# Patient Record
Sex: FEMALE | Race: BLACK OR AFRICAN AMERICAN | HISPANIC OR LATINO | Employment: UNEMPLOYED | URBAN - METROPOLITAN AREA
[De-identification: names, ages, dates, MRNs, and addresses within clinical notes are randomized per-mention and may not be internally consistent; named-entity substitution may affect disease eponyms.]

---

## 2024-06-20 ENCOUNTER — OFFICE VISIT (OUTPATIENT)
Dept: INTERNAL MEDICINE | Facility: CLINIC | Age: 64
End: 2024-06-20
Payer: COMMERCIAL

## 2024-06-20 VITALS
HEART RATE: 83 BPM | OXYGEN SATURATION: 98 % | SYSTOLIC BLOOD PRESSURE: 148 MMHG | TEMPERATURE: 98.2 F | WEIGHT: 209 LBS | HEIGHT: 63 IN | BODY MASS INDEX: 37.03 KG/M2 | DIASTOLIC BLOOD PRESSURE: 70 MMHG | RESPIRATION RATE: 16 BRPM

## 2024-06-20 DIAGNOSIS — M77.31 HEEL SPUR, RIGHT: ICD-10-CM

## 2024-06-20 DIAGNOSIS — M25.532 LEFT WRIST PAIN: ICD-10-CM

## 2024-06-20 DIAGNOSIS — M54.42 CHRONIC BILATERAL LOW BACK PAIN WITH BILATERAL SCIATICA: ICD-10-CM

## 2024-06-20 DIAGNOSIS — Z11.59 SCREENING FOR VIRAL DISEASE: ICD-10-CM

## 2024-06-20 DIAGNOSIS — R73.9 HYPERGLYCEMIA: ICD-10-CM

## 2024-06-20 DIAGNOSIS — M54.41 CHRONIC BILATERAL LOW BACK PAIN WITH BILATERAL SCIATICA: ICD-10-CM

## 2024-06-20 DIAGNOSIS — G89.29 CHRONIC BILATERAL LOW BACK PAIN WITH BILATERAL SCIATICA: ICD-10-CM

## 2024-06-20 DIAGNOSIS — E11.65 TYPE 2 DIABETES MELLITUS WITH HYPERGLYCEMIA, WITHOUT LONG-TERM CURRENT USE OF INSULIN: ICD-10-CM

## 2024-06-20 DIAGNOSIS — L30.9 ECZEMA, UNSPECIFIED TYPE: ICD-10-CM

## 2024-06-20 DIAGNOSIS — M79.671 RIGHT FOOT PAIN: ICD-10-CM

## 2024-06-20 DIAGNOSIS — R03.0 ELEVATED BLOOD PRESSURE READING WITHOUT DIAGNOSIS OF HYPERTENSION: Primary | ICD-10-CM

## 2024-06-20 DIAGNOSIS — Z12.31 ENCOUNTER FOR SCREENING MAMMOGRAM FOR BREAST CANCER: ICD-10-CM

## 2024-06-20 LAB
ALBUMIN SERPL-MCNC: 4.1 G/DL (ref 3.5–5.2)
ALBUMIN/GLOB SERPL: 1 G/DL
ALP SERPL-CCNC: 140 U/L (ref 39–117)
ALT SERPL W P-5'-P-CCNC: 39 U/L (ref 1–33)
ANION GAP SERPL CALCULATED.3IONS-SCNC: 7 MMOL/L (ref 5–15)
AST SERPL-CCNC: 26 U/L (ref 1–32)
BASOPHILS # BLD AUTO: 0.05 10*3/MM3 (ref 0–0.2)
BASOPHILS NFR BLD AUTO: 0.7 % (ref 0–1.5)
BILIRUB SERPL-MCNC: 0.2 MG/DL (ref 0–1.2)
BUN SERPL-MCNC: 16 MG/DL (ref 8–23)
BUN/CREAT SERPL: 15.4 (ref 7–25)
CALCIUM SPEC-SCNC: 9.5 MG/DL (ref 8.6–10.5)
CHLORIDE SERPL-SCNC: 98 MMOL/L (ref 98–107)
CHOLEST SERPL-MCNC: 142 MG/DL (ref 0–200)
CO2 SERPL-SCNC: 31 MMOL/L (ref 22–29)
CREAT SERPL-MCNC: 1.04 MG/DL (ref 0.57–1)
DEPRECATED RDW RBC AUTO: 44.9 FL (ref 37–54)
EGFRCR SERPLBLD CKD-EPI 2021: 60.1 ML/MIN/1.73
EOSINOPHIL # BLD AUTO: 0.23 10*3/MM3 (ref 0–0.4)
EOSINOPHIL NFR BLD AUTO: 3.4 % (ref 0.3–6.2)
ERYTHROCYTE [DISTWIDTH] IN BLOOD BY AUTOMATED COUNT: 13.1 % (ref 12.3–15.4)
GLOBULIN UR ELPH-MCNC: 4 GM/DL
GLUCOSE SERPL-MCNC: 150 MG/DL (ref 65–99)
HCT VFR BLD AUTO: 41.9 % (ref 34–46.6)
HCV AB SER QL: NORMAL
HDLC SERPL-MCNC: 32 MG/DL (ref 40–60)
HGB BLD-MCNC: 13.5 G/DL (ref 12–15.9)
IMM GRANULOCYTES # BLD AUTO: 0.02 10*3/MM3 (ref 0–0.05)
IMM GRANULOCYTES NFR BLD AUTO: 0.3 % (ref 0–0.5)
LDLC SERPL CALC-MCNC: 80 MG/DL (ref 0–100)
LDLC/HDLC SERPL: 2.37 {RATIO}
LYMPHOCYTES # BLD AUTO: 2.69 10*3/MM3 (ref 0.7–3.1)
LYMPHOCYTES NFR BLD AUTO: 40.1 % (ref 19.6–45.3)
MCH RBC QN AUTO: 29.5 PG (ref 26.6–33)
MCHC RBC AUTO-ENTMCNC: 32.2 G/DL (ref 31.5–35.7)
MCV RBC AUTO: 91.5 FL (ref 79–97)
MONOCYTES # BLD AUTO: 0.77 10*3/MM3 (ref 0.1–0.9)
MONOCYTES NFR BLD AUTO: 11.5 % (ref 5–12)
NEUTROPHILS NFR BLD AUTO: 2.95 10*3/MM3 (ref 1.7–7)
NEUTROPHILS NFR BLD AUTO: 44 % (ref 42.7–76)
NRBC BLD AUTO-RTO: 0 /100 WBC (ref 0–0.2)
PLATELET # BLD AUTO: 217 10*3/MM3 (ref 140–450)
PMV BLD AUTO: 11.6 FL (ref 6–12)
POTASSIUM SERPL-SCNC: 4.6 MMOL/L (ref 3.5–5.2)
PROT SERPL-MCNC: 8.1 G/DL (ref 6–8.5)
RBC # BLD AUTO: 4.58 10*6/MM3 (ref 3.77–5.28)
SODIUM SERPL-SCNC: 136 MMOL/L (ref 136–145)
TRIGL SERPL-MCNC: 171 MG/DL (ref 0–150)
TSH SERPL DL<=0.05 MIU/L-ACNC: 1.8 UIU/ML (ref 0.27–4.2)
VLDLC SERPL-MCNC: 30 MG/DL (ref 5–40)
WBC NRBC COR # BLD AUTO: 6.71 10*3/MM3 (ref 3.4–10.8)

## 2024-06-20 PROCEDURE — 83036 HEMOGLOBIN GLYCOSYLATED A1C: CPT | Performed by: PHYSICIAN ASSISTANT

## 2024-06-20 PROCEDURE — 86803 HEPATITIS C AB TEST: CPT | Performed by: PHYSICIAN ASSISTANT

## 2024-06-20 PROCEDURE — 85025 COMPLETE CBC W/AUTO DIFF WBC: CPT | Performed by: PHYSICIAN ASSISTANT

## 2024-06-20 PROCEDURE — 84443 ASSAY THYROID STIM HORMONE: CPT | Performed by: PHYSICIAN ASSISTANT

## 2024-06-20 PROCEDURE — 80061 LIPID PANEL: CPT | Performed by: PHYSICIAN ASSISTANT

## 2024-06-20 PROCEDURE — 80053 COMPREHEN METABOLIC PANEL: CPT | Performed by: PHYSICIAN ASSISTANT

## 2024-06-20 RX ORDER — TRIAMCINOLONE ACETONIDE 1 MG/G
1 OINTMENT TOPICAL 2 TIMES DAILY
Qty: 30 G | Refills: 0 | Status: SHIPPED | OUTPATIENT
Start: 2024-06-20

## 2024-06-20 NOTE — PROGRESS NOTES
Chief Complaint  Establish Care, Arm Pain, Numbness, Back Pain, Foot Pain, and skin issue    Subjective          Florina Vargas presents to [facility]  History of Present Illness  Last PCP: Anil Thornton in [place]  Previous Specialists:  gastroenterology for colonoscopy  Last labs: Unknown  Last mammogram: 2023, no family history of breast cancer  Last pap: 1980s  Last colonoscopy: Sept 2023-  done in [place] at [facility] in [facility],  family history of colon cancer - maternal aunt    Pt here to [facility] with several complaints    Rash: bilateral elbows x 1 month  Started when she moved to [place]   Rash is red and itchy   Denies rash anywhere else     LBP: x yrs   Pain goes down both legs. Tingling in both legs.   Denies incontinence or saddle anesthesia   Pt has been dx with DDD on Xray 1 year ago   States she was dx with arthritis   Previously prescribed Tramadol   She was previously trying PT in [place] but it did not help  Tylenol and naproxen which helps     States she fell in Feb before she moved to [place]. States she slipped and fell on buttock, L wrist  She states she had Xray done after the fall and was told it was normal.   She is still having pain in L wrist.  She states hand tingling at times   She feels strength in hand is decreased     Pt states she fell in Jan because she twisted her R foot  She has had pain in R foot since that time        History reviewed. No pertinent past medical history.     Past Surgical History:   Procedure Laterality Date    APPENDECTOMY  1985    HAND SURGERY Bilateral 2000    carpal tunnel    KNEE SURGERY Left 2000    ligament injury after a fall        No current outpatient medications on file prior to visit.     No current facility-administered medications on file prior to visit.        No Known Allergies    Social History     Tobacco Use   Smoking Status Never   Smokeless Tobacco Never          Objective   Vital  "Signs:   /70 (BP Location: Left arm, Patient Position: Sitting, Cuff Size: Adult)   Pulse 83   Temp 98.2 °F (36.8 °C) (Temporal)   Resp 16   Ht 160 cm (63\")   Wt 94.8 kg (209 lb)   SpO2 98%   BMI 37.02 kg/m²     Physical Exam  Vitals reviewed.   Constitutional:       Appearance: Normal appearance.   HENT:      Head: Normocephalic and atraumatic.      Nose: Nose normal.      Mouth/Throat:      Mouth: Mucous membranes are moist.   Eyes:      Extraocular Movements: Extraocular movements intact.      Conjunctiva/sclera: Conjunctivae normal.      Pupils: Pupils are equal, round, and reactive to light.   Cardiovascular:      Rate and Rhythm: Normal rate and regular rhythm.   Pulmonary:      Effort: Pulmonary effort is normal.      Breath sounds: Normal breath sounds.   Abdominal:      General: Abdomen is flat. Bowel sounds are normal.      Palpations: Abdomen is soft.   Musculoskeletal:         General: Normal range of motion.      Left wrist: Tenderness present.        Arms:       Lumbar back: Tenderness present.        Back:         Feet:    Feet:      Comments: TTP  Skin:     Findings: Rash present.          Neurological:      General: No focal deficit present.      Mental Status: She is alert and oriented to person, place, and time.   Psychiatric:         Mood and Affect: Mood normal.        Result Review :   The following data was reviewed by: Chastity Martel PA-C on 06/20/2024:    Data reviewed : Radiologic studies xrays                    Assessment and Plan    Diagnoses and all orders for this visit:    1. Elevated blood pressure reading without diagnosis of hypertension (Primary)  Assessment & Plan:  Discussed bp elevation at today's visit. Not high enough for medication at this time. Discussed risks of blood pressure elevation including death, heart attack, stroke, kidney disease, blindness. Low salt diet, increase exercise. We will monitor at follow up and discuss blood pressure medications if " necessary. To er if chest pain, palpitations, vision loss, unilateral weakness, altered mental status. Pt understands and agrees with plan.      Orders:  -     Comprehensive Metabolic Panel  -     CBC & Differential  -     TSH  -     Lipid Panel    2. Encounter for screening mammogram for breast cancer  -     Mammo Screening Digital Tomosynthesis Bilateral With CAD; Future    3. Screening for viral disease  -     Hepatitis C Antibody    4. Eczema, unspecified type  Assessment & Plan:  Discussed eczema. Keep skin moisturized, using moisturizing lotion or cream such as Aquaphor or Cedafil 1-2 times daily. Encouraged to decrease quantity of baths if possible. Use unscented soap in baths. Try All Free and Clear laundry detergent. Will start topical steriod for severe symptoms. Only use as needed due to risks of hypopigmentation and skin thinning.         5. Left wrist pain  Comments:  X-ray showed arthritis, will refer to physical therapy  Orders:  -     XR Wrist 3+ View Left (In Office)  -     Ambulatory Referral to Physical Therapy    6. Chronic bilateral low back pain with bilateral sciatica  Comments:  X-ray normal, will refer to physical therapy.  Can take over-the-counter NSAID or Tylenol as needed.  Orders:  -     XR Spine Lumbar 4+ View (In Office)  -     Ambulatory Referral to Physical Therapy    7. Right foot pain  Comments:  X-ray showing heel spur, referred to podiatry for further evaluation.  Orders:  -     XR Foot 3+ View Right; Future  -     Ambulatory Referral to Podiatry    8. Hyperglycemia  -     Hemoglobin A1c; Future  -     Hemoglobin A1c    9. Heel spur, right  -     Ambulatory Referral to Podiatry    10. Type 2 diabetes mellitus with hyperglycemia, without long-term current use of insulin    Other orders  -     triamcinolone (KENALOG) 0.1 % ointment; Apply 1 Application topically to the appropriate area as directed 2 (Two) Times a Day.  Dispense: 30 g; Refill: 0  -     glucose blood test strip;  Check blood sugar once daily in the morning  Dispense: 100 each; Refill: 12  -     Lancets (freestyle) lancets; Check blood sugar once daily in the morning  Dispense: 100 each; Refill: 12  -     Blood Glucose Monitoring Suppl w/Device kit; Dispense 1 kit with monitor to check blood sugar once daily. Diagnosis: DM E11.65  Dispense: 1 each; Refill: 0  -     metFORMIN (GLUCOPHAGE) 1000 MG tablet; Take 1 tablet by mouth 2 (Two) Times a Day With Meals.  Dispense: 60 tablet; Refill: 1        Follow Up   Return in about 1 month (around 7/20/2024).  Patient was given instructions and counseling regarding her condition or for health maintenance advice. Please see specific information pulled into the AVS if appropriate.

## 2024-06-21 PROBLEM — L30.9 ECZEMA: Status: ACTIVE | Noted: 2024-06-21

## 2024-06-21 PROBLEM — R03.0 ELEVATED BLOOD PRESSURE READING WITHOUT DIAGNOSIS OF HYPERTENSION: Status: ACTIVE | Noted: 2024-06-21

## 2024-06-21 PROBLEM — E11.65 TYPE 2 DIABETES MELLITUS WITH HYPERGLYCEMIA, WITHOUT LONG-TERM CURRENT USE OF INSULIN: Status: ACTIVE | Noted: 2024-06-21

## 2024-06-21 LAB — HBA1C MFR BLD: 9.2 % (ref 4.8–5.6)

## 2024-06-21 RX ORDER — LANCETS 28 GAUGE
EACH MISCELLANEOUS
Qty: 100 EACH | Refills: 12 | Status: SHIPPED | OUTPATIENT
Start: 2024-06-21

## 2024-07-08 ENCOUNTER — OFFICE VISIT (OUTPATIENT)
Dept: INTERNAL MEDICINE | Facility: CLINIC | Age: 64
End: 2024-07-08
Payer: MEDICARE

## 2024-07-08 VITALS
WEIGHT: 211 LBS | DIASTOLIC BLOOD PRESSURE: 80 MMHG | SYSTOLIC BLOOD PRESSURE: 132 MMHG | HEIGHT: 63 IN | HEART RATE: 87 BPM | BODY MASS INDEX: 37.39 KG/M2 | OXYGEN SATURATION: 97 % | TEMPERATURE: 97.2 F | RESPIRATION RATE: 16 BRPM

## 2024-07-08 DIAGNOSIS — I10 ESSENTIAL HYPERTENSION: ICD-10-CM

## 2024-07-08 DIAGNOSIS — G89.29 CHRONIC BILATERAL LOW BACK PAIN WITH BILATERAL SCIATICA: ICD-10-CM

## 2024-07-08 DIAGNOSIS — G89.29 CHRONIC LEFT SHOULDER PAIN: ICD-10-CM

## 2024-07-08 DIAGNOSIS — M54.42 CHRONIC BILATERAL LOW BACK PAIN WITH BILATERAL SCIATICA: ICD-10-CM

## 2024-07-08 DIAGNOSIS — M25.512 CHRONIC LEFT SHOULDER PAIN: ICD-10-CM

## 2024-07-08 DIAGNOSIS — Z00.00 MEDICARE ANNUAL WELLNESS VISIT, SUBSEQUENT: Primary | ICD-10-CM

## 2024-07-08 DIAGNOSIS — E11.65 TYPE 2 DIABETES MELLITUS WITH HYPERGLYCEMIA, WITHOUT LONG-TERM CURRENT USE OF INSULIN: ICD-10-CM

## 2024-07-08 DIAGNOSIS — M54.41 CHRONIC BILATERAL LOW BACK PAIN WITH BILATERAL SCIATICA: ICD-10-CM

## 2024-07-08 PROBLEM — R03.0 ELEVATED BLOOD PRESSURE READING WITHOUT DIAGNOSIS OF HYPERTENSION: Status: RESOLVED | Noted: 2024-06-21 | Resolved: 2024-07-08

## 2024-07-08 PROCEDURE — 1160F RVW MEDS BY RX/DR IN RCRD: CPT | Performed by: PHYSICIAN ASSISTANT

## 2024-07-08 PROCEDURE — 3046F HEMOGLOBIN A1C LEVEL >9.0%: CPT | Performed by: PHYSICIAN ASSISTANT

## 2024-07-08 PROCEDURE — 3079F DIAST BP 80-89 MM HG: CPT | Performed by: PHYSICIAN ASSISTANT

## 2024-07-08 PROCEDURE — G0439 PPPS, SUBSEQ VISIT: HCPCS | Performed by: PHYSICIAN ASSISTANT

## 2024-07-08 PROCEDURE — 3075F SYST BP GE 130 - 139MM HG: CPT | Performed by: PHYSICIAN ASSISTANT

## 2024-07-08 PROCEDURE — 1159F MED LIST DOCD IN RCRD: CPT | Performed by: PHYSICIAN ASSISTANT

## 2024-07-08 PROCEDURE — 99214 OFFICE O/P EST MOD 30 MIN: CPT | Performed by: PHYSICIAN ASSISTANT

## 2024-07-08 RX ORDER — LISINOPRIL 5 MG/1
5 TABLET ORAL DAILY
Qty: 30 TABLET | Refills: 1 | Status: SHIPPED | OUTPATIENT
Start: 2024-07-08

## 2024-07-08 NOTE — PROGRESS NOTES
The ABCs of the Annual Wellness Visit  Subsequent Medicare Wellness Visit    Subjective      Florina Vargas is a 63 y.o. female who presents for a Subsequent Medicare Wellness Visit.    The following portions of the patient's history were reviewed and   updated as appropriate: allergies, current medications, past family history, past medical history, past social history, past surgical history, and problem list.    Compared to one year ago, the patient feels her physical   health is better.    Compared to one year ago, the patient feels her mental   health is better.    Recent Hospitalizations:  She was not admitted to the hospital during the last year.       Current Medical Providers:  Patient Care Team:  Chastity Martel PA-C as PCP - General (Physician Assistant)    Outpatient Medications Prior to Visit   Medication Sig Dispense Refill    glucose blood test strip Check blood sugar once daily in the morning 100 each 12    Lancets (freestyle) lancets Check blood sugar once daily in the morning 100 each 12    triamcinolone (KENALOG) 0.1 % ointment Apply 1 Application topically to the appropriate area as directed 2 (Two) Times a Day. 30 g 0    Blood Glucose Monitoring Suppl w/Device kit Dispense 1 kit with monitor to check blood sugar once daily. Diagnosis: DM E11.65 1 each 0    metFORMIN (GLUCOPHAGE) 1000 MG tablet Take 1 tablet by mouth 2 (Two) Times a Day With Meals. (Patient not taking: Reported on 7/8/2024) 60 tablet 1     No facility-administered medications prior to visit.       No opioid medication identified on active medication list. I have reviewed chart for other potential  high risk medication/s and harmful drug interactions in the elderly.        Aspirin is not on active medication list.  Aspirin use is not indicated based on review of current medical condition/s. Risk of harm outweighs potential benefits.  .    Patient Active Problem List   Diagnosis    Type 2 diabetes mellitus with hyperglycemia,  "without long-term current use of insulin    Eczema    Essential hypertension    Medicare annual wellness visit, subsequent     Advance Care Planning   Advance Care Planning     Advance Directive is not on file.  ACP discussion was held with the patient during this visit. Patient does not have an advance directive, information provided.     Objective    Vitals:    24 1606   BP: 132/80   BP Location: Right arm   Patient Position: Sitting   Cuff Size: Adult   Pulse: 87   Resp: 16   Temp: 97.2 °F (36.2 °C)   TempSrc: Temporal   SpO2: 97%   Weight: 95.7 kg (211 lb)   Height: 160 cm (63\")     Estimated body mass index is 37.38 kg/m² as calculated from the following:    Height as of this encounter: 160 cm (63\").    Weight as of this encounter: 95.7 kg (211 lb).           Does the patient have evidence of cognitive impairment?   No    Lab Results   Component Value Date    TRIG 171 (H) 2024    HDL 32 (L) 2024    LDL 80 2024    VLDL 30 2024    HGBA1C 9.20 (H) 2024          HEALTH RISK ASSESSMENT    Smoking Status:  Social History     Tobacco Use   Smoking Status Never   Smokeless Tobacco Never     Alcohol Consumption:  Social History     Substance and Sexual Activity   Alcohol Use Never     Fall Risk Screen:    AGUSTIN Fall Risk Assessment was completed, and patient is at LOW risk for falls.Assessment completed on:2024    Depression Screenin/8/2024     4:00 PM   PHQ-2/PHQ-9 Depression Screening   Little Interest or Pleasure in Doing Things 0-->not at all   Feeling Down, Depressed or Hopeless 0-->not at all   PHQ-9: Brief Depression Severity Measure Score 0       Health Habits and Functional and Cognitive Screenin/8/2024     4:00 PM   Functional & Cognitive Status   Do you have difficulty preparing food and eating? No   Do you have difficulty bathing yourself, getting dressed or grooming yourself? No   Do you have difficulty using the toilet? No   Do you have difficulty " moving around from place to place? No   Do you have trouble with steps or getting out of a bed or a chair? No   Current Diet Well Balanced Diet   Dental Exam Up to date   Eye Exam Not up to date   Exercise (times per week) 3 times per week   Current Exercises Include Walking   Do you need help using the phone?  No   Are you deaf or do you have serious difficulty hearing?  No   Do you need help to go to places out of walking distance? No   Do you need help shopping? No   Do you need help preparing meals?  No   Do you need help with housework?  No   Do you need help with laundry? No   Do you need help taking your medications? No   Do you need help managing money? No   Do you ever drive or ride in a car without wearing a seat belt? No   Have you felt unusual stress, anger or loneliness in the last month? No   Who do you live with? Child   If you need help, do you have trouble finding someone available to you? No   Have you been bothered in the last four weeks by sexual problems? No   Do you have difficulty concentrating, remembering or making decisions? No       Age-appropriate Screening Schedule:  Refer to the list below for future screening recommendations based on patient's age, sex and/or medical conditions. Orders for these recommended tests are listed in the plan section. The patient has been provided with a written plan.    Health Maintenance   Topic Date Due    MAMMOGRAM  Never done    URINE MICROALBUMIN  Never done    Pneumococcal Vaccine 0-64 (1 of 2 - PCV) Never done    DIABETIC EYE EXAM  Never done    TDAP/TD VACCINES (1 - Tdap) Never done    ZOSTER VACCINE (1 of 2) Never done    COVID-19 Vaccine (1 - 2023-24 season) Never done    ANNUAL WELLNESS VISIT  Never done    DIABETIC FOOT EXAM  Never done    PAP SMEAR  Never done    INFLUENZA VACCINE  08/01/2024    HEMOGLOBIN A1C  12/20/2024    BMI FOLLOWUP  06/21/2025    COLORECTAL CANCER SCREENING  09/01/2033    HEPATITIS C SCREENING  Completed                   CMS Preventative Services Quick Reference  Risk Factors Identified During Encounter:    Immunizations Discussed/Encouraged: Prevnar 20 (Pneumococcal 20-valent conjugate)    The above risks/problems have been discussed with the patient.  Pertinent information has been shared with the patient in the After Visit Summary.    Diagnoses and all orders for this visit:    1. Medicare annual wellness visit, subsequent (Primary)  Assessment & Plan:  Reviewed preventative medication recommendations that are age appropriate for the patient. Education provided for health and wellness. Encouraged healthy diet, regular exercise, and routine wellness checkups.  Pt will get pneumonia vaccine at f/u      2. Type 2 diabetes mellitus with hyperglycemia, without long-term current use of insulin  Assessment & Plan:  Discussed newly diagnosed diabetes in detail with patient. Discussed that A1c is elevated (9.2) giving patient the diagnosis of diabetes. Discussed insulin recommended if >9. Pt would like to try PO medicine first. Risks of blood sugar elevation include death, heart attack, stroke, kidney disease, blindness. Discussed importance of medication compliance and not missing doses. Discussed different medication options/classes and side effects. We will start metformin today. Most common side effect is muscle cramping in the abdomen and diarrhea, this usually improves 1-2 weeks after taking medicine daily. If symptoms do not improve let us know. We will monitor at follow up and adjust medications if sugars are still elevated. Discussed glucometer sent to pharmacy, check blood sugar first thing in the morning to get fasting sugar reading. Bring blood glucose log to follow up. To ER if chest pain, palpitations, vision loss, unilateral weakness, altered mental status. Pt understands and agrees with plan.       3. Essential hypertension  Assessment & Plan:  Hypertension is stable and controlled  Continue current treatment  regimen.  Blood pressure will be reassessed in 3 months.      4. Chronic left shoulder pain  Comments:  Will get Xray and refer to PT or ortho based on results.  Orders:  -     XR Shoulder 2+ View Left; Future    Other orders  -     Blood Glucose Monitoring Suppl w/Device kit; Dispense 1 kit with monitor to check blood sugar once daily. Diagnosis: DM E11.65  Dispense: 1 each; Refill: 0  -     lisinopril (PRINIVIL,ZESTRIL) 5 MG tablet; Take 1 tablet by mouth Daily.  Dispense: 30 tablet; Refill: 1        Follow Up:   Next Medicare Wellness visit to be scheduled in 1 year.      An After Visit Summary and PPPS were made available to the patient.

## 2024-07-08 NOTE — PROGRESS NOTES
Chief Complaint  Diabetes, shoulder pain, back pain    Subjective          Florina Vargas presents to [facility]  History of Present Illness  Pt family member translated for her in office. Pt declined use of  service.     DM: pt has not yet started metformin because she did not think she had diabetes  She wanted to come into office first to discuss  Both parents have diabetes  She has not been able to check bg because she was given strips but no monitor     Chronic L shoulder pain: has had pain since she fell in Feb 2024  She states she landed on shoulder, did not reach out to catch herself. She had xray done in the past after fall but does not remember getting xray of the shoulder  She is able to move shoulder normally    LBP: she has not started PT yet   Denies incontinence or saddle anesthesia   History reviewed. No pertinent past medical history.     Past Surgical History:   Procedure Laterality Date    APPENDECTOMY  1985    HAND SURGERY Bilateral 2000    carpal tunnel    KNEE SURGERY Left 2000    ligament injury after a fall        Current Outpatient Medications on File Prior to Visit   Medication Sig Dispense Refill    glucose blood test strip Check blood sugar once daily in the morning 100 each 12    Lancets (freestyle) lancets Check blood sugar once daily in the morning 100 each 12    triamcinolone (KENALOG) 0.1 % ointment Apply 1 Application topically to the appropriate area as directed 2 (Two) Times a Day. 30 g 0    metFORMIN (GLUCOPHAGE) 1000 MG tablet Take 1 tablet by mouth 2 (Two) Times a Day With Meals. (Patient not taking: Reported on 7/8/2024) 60 tablet 1     No current facility-administered medications on file prior to visit.        No Known Allergies    Social History     Tobacco Use   Smoking Status Never   Smokeless Tobacco Never          Objective   Vital Signs:   /80 (BP Location: Right arm, Patient Position: Sitting, Cuff Size:  "Adult)   Pulse 87   Temp 97.2 °F (36.2 °C) (Temporal)   Resp 16   Ht 160 cm (63\")   Wt 95.7 kg (211 lb)   SpO2 97%   BMI 37.38 kg/m²     Physical Exam  Vitals reviewed.   Constitutional:       Appearance: Normal appearance.   HENT:      Head: Normocephalic and atraumatic.      Nose: Nose normal.      Mouth/Throat:      Mouth: Mucous membranes are moist.   Eyes:      Extraocular Movements: Extraocular movements intact.      Conjunctiva/sclera: Conjunctivae normal.      Pupils: Pupils are equal, round, and reactive to light.   Cardiovascular:      Rate and Rhythm: Normal rate and regular rhythm.   Pulmonary:      Effort: Pulmonary effort is normal.      Breath sounds: Normal breath sounds.   Abdominal:      General: Abdomen is flat. Bowel sounds are normal.      Palpations: Abdomen is soft.   Musculoskeletal:         General: Normal range of motion.      Right shoulder: Normal.      Left shoulder: Tenderness present. No swelling or deformity. Normal range of motion.        Arms:    Neurological:      General: No focal deficit present.      Mental Status: She is alert and oriented to person, place, and time.   Psychiatric:         Mood and Affect: Mood normal.        Result Review :   The following data was reviewed by: Chastity Martel PA-C on 07/08/2024:  Common labs          6/20/2024    16:40   Common Labs   Glucose 150    BUN 16    Creatinine 1.04    Sodium 136    Potassium 4.6    Chloride 98    Calcium 9.5    Albumin 4.1    Total Bilirubin 0.2    Alkaline Phosphatase 140    AST (SGOT) 26    ALT (SGPT) 39    WBC 6.71    Hemoglobin 13.5    Hematocrit 41.9    Platelets 217    Total Cholesterol 142    Triglycerides 171    HDL Cholesterol 32    LDL Cholesterol  80    Hemoglobin A1C 9.20      Data reviewed : Radiologic studies xray wrist, xray back, xray foot                      Assessment and Plan    Diagnoses and all orders for this visit:    1. Medicare annual wellness visit, subsequent (Primary)  Assessment " & Plan:  Reviewed preventative medication recommendations that are age appropriate for the patient. Education provided for health and wellness. Encouraged healthy diet, regular exercise, and routine wellness checkups.  Pt will get pneumonia vaccine at f/u      2. Type 2 diabetes mellitus with hyperglycemia, without long-term current use of insulin  Assessment & Plan:  Discussed newly diagnosed diabetes in detail with patient. Discussed that A1c is elevated (9.2) giving patient the diagnosis of diabetes. Discussed insulin recommended if >9. Pt would like to try PO medicine first. Risks of blood sugar elevation include death, heart attack, stroke, kidney disease, blindness. Discussed importance of medication compliance and not missing doses. Discussed different medication options/classes and side effects. We will start metformin today. Most common side effect is muscle cramping in the abdomen and diarrhea, this usually improves 1-2 weeks after taking medicine daily. If symptoms do not improve let us know. We will monitor at follow up and adjust medications if sugars are still elevated. Discussed glucometer sent to pharmacy, check blood sugar first thing in the morning to get fasting sugar reading. Bring blood glucose log to follow up. To ER if chest pain, palpitations, vision loss, unilateral weakness, altered mental status. Pt understands and agrees with plan.     Orders:  -     MicroAlbumin, Urine, Random - Urine, Clean Catch; Future    3. Essential hypertension  Assessment & Plan:  Discussed Lisinopril for BP and kidney protection. Change position slowly the first week to allow for body to adjust to decrease in blood pressure. Discussed side effects including dry cough and angioedema.        4. Chronic left shoulder pain  Comments:  Will get Xray and refer to PT or ortho based on results.  Orders:  -     XR Shoulder 2+ View Left; Future    5. Chronic bilateral low back pain with bilateral  sciatica  Comments:  reviewed normal back xray. Discussed PT referral sent. Can use otc nsaid or tylenol prn.    Other orders  -     Blood Glucose Monitoring Suppl w/Device kit; Dispense 1 kit with monitor to check blood sugar once daily. Diagnosis: DM E11.65  Dispense: 1 each; Refill: 0  -     lisinopril (PRINIVIL,ZESTRIL) 5 MG tablet; Take 1 tablet by mouth Daily.  Dispense: 30 tablet; Refill: 1        Follow Up   Return in about 1 month (around 8/8/2024).  Patient was given instructions and counseling regarding her condition or for health maintenance advice. Please see specific information pulled into the AVS if appropriate.

## 2024-07-10 PROBLEM — G89.29 CHRONIC BILATERAL LOW BACK PAIN WITH BILATERAL SCIATICA: Status: ACTIVE | Noted: 2024-07-10

## 2024-07-10 PROBLEM — M54.41 CHRONIC BILATERAL LOW BACK PAIN WITH BILATERAL SCIATICA: Status: ACTIVE | Noted: 2024-07-10

## 2024-07-10 PROBLEM — Z00.00 MEDICARE ANNUAL WELLNESS VISIT, SUBSEQUENT: Status: ACTIVE | Noted: 2024-07-10

## 2024-07-10 PROBLEM — M54.42 CHRONIC BILATERAL LOW BACK PAIN WITH BILATERAL SCIATICA: Status: ACTIVE | Noted: 2024-07-10

## 2024-07-10 NOTE — ASSESSMENT & PLAN NOTE
Discussed Lisinopril for BP and kidney protection. Change position slowly the first week to allow for body to adjust to decrease in blood pressure. Discussed side effects including dry cough and angioedema.

## 2024-07-10 NOTE — ASSESSMENT & PLAN NOTE
Reviewed preventative medication recommendations that are age appropriate for the patient. Education provided for health and wellness. Encouraged healthy diet, regular exercise, and routine wellness checkups.  Pt will get pneumonia vaccine at f/u

## 2024-07-10 NOTE — ASSESSMENT & PLAN NOTE
Discussed newly diagnosed diabetes in detail with patient. Discussed that A1c is elevated (9.2) giving patient the diagnosis of diabetes. Discussed insulin recommended if >9. Pt would like to try PO medicine first. Risks of blood sugar elevation include death, heart attack, stroke, kidney disease, blindness. Discussed importance of medication compliance and not missing doses. Discussed different medication options/classes and side effects. We will start metformin today. Most common side effect is muscle cramping in the abdomen and diarrhea, this usually improves 1-2 weeks after taking medicine daily. If symptoms do not improve let us know. We will monitor at follow up and adjust medications if sugars are still elevated. Discussed glucometer sent to pharmacy, check blood sugar first thing in the morning to get fasting sugar reading. Bring blood glucose log to follow up. To ER if chest pain, palpitations, vision loss, unilateral weakness, altered mental status. Pt understands and agrees with plan.

## 2024-09-13 RX ORDER — LISINOPRIL 5 MG/1
5 TABLET ORAL DAILY
Qty: 30 TABLET | Refills: 1 | Status: SHIPPED | OUTPATIENT
Start: 2024-09-13

## 2024-11-15 RX ORDER — LISINOPRIL 5 MG/1
5 TABLET ORAL DAILY
Qty: 30 TABLET | Refills: 1 | Status: SHIPPED | OUTPATIENT
Start: 2024-11-15

## 2025-01-23 ENCOUNTER — TRANSCRIBE ORDERS (OUTPATIENT)
Dept: ADMINISTRATIVE | Facility: HOSPITAL | Age: 65
End: 2025-01-23
Payer: MEDICARE

## 2025-01-23 ENCOUNTER — TRANSCRIBE ORDERS (OUTPATIENT)
Dept: INTERNAL MEDICINE | Facility: CLINIC | Age: 65
End: 2025-01-23
Payer: MEDICARE

## 2025-01-23 DIAGNOSIS — R10.9 ABDOMINAL PAIN, UNSPECIFIED ABDOMINAL LOCATION: Primary | ICD-10-CM
